# Patient Record
Sex: FEMALE | Race: WHITE | NOT HISPANIC OR LATINO | Employment: FULL TIME | URBAN - METROPOLITAN AREA
[De-identification: names, ages, dates, MRNs, and addresses within clinical notes are randomized per-mention and may not be internally consistent; named-entity substitution may affect disease eponyms.]

---

## 2017-01-09 ENCOUNTER — OFFICE VISIT (OUTPATIENT)
Dept: ONCOLOGY | Facility: CLINIC | Age: 57
End: 2017-01-09

## 2017-01-09 VITALS
RESPIRATION RATE: 15 BRPM | TEMPERATURE: 97.9 F | WEIGHT: 147 LBS | DIASTOLIC BLOOD PRESSURE: 66 MMHG | HEART RATE: 72 BPM | BODY MASS INDEX: 26.89 KG/M2 | SYSTOLIC BLOOD PRESSURE: 127 MMHG

## 2017-01-09 DIAGNOSIS — C50.411 MALIGNANT NEOPLASM OF UPPER-OUTER QUADRANT OF RIGHT FEMALE BREAST (HCC): Primary | ICD-10-CM

## 2017-01-09 PROCEDURE — 99213 OFFICE O/P EST LOW 20 MIN: CPT | Performed by: INTERNAL MEDICINE
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
## 2017-06-12 RX ORDER — ANASTROZOLE 1 MG/1
TABLET ORAL
Qty: 90 TABLET | Refills: 3 | Status: SHIPPED | OUTPATIENT
Start: 2017-06-12 | End: 2018-01-19 | Stop reason: SDUPTHER

## 2017-09-15 RX ORDER — OMEPRAZOLE 20 MG/1
CAPSULE, DELAYED RELEASE ORAL
Qty: 90 CAPSULE | Refills: 3 | Status: SHIPPED | OUTPATIENT
Start: 2017-09-15 | End: 2018-10-31 | Stop reason: SDUPTHER

## 2017-10-02 DIAGNOSIS — C50.411 MALIGNANT NEOPLASM OF UPPER-OUTER QUADRANT OF RIGHT FEMALE BREAST, UNSPECIFIED ESTROGEN RECEPTOR STATUS (HCC): Primary | ICD-10-CM

## 2018-01-19 ENCOUNTER — OFFICE VISIT (OUTPATIENT)
Dept: ONCOLOGY | Facility: CLINIC | Age: 58
End: 2018-01-19

## 2018-01-19 ENCOUNTER — HOSPITAL ENCOUNTER (OUTPATIENT)
Dept: GENERAL RADIOLOGY | Facility: HOSPITAL | Age: 58
Discharge: HOME | End: 2018-01-19
Admitting: NURSE PRACTITIONER

## 2018-01-19 ENCOUNTER — APPOINTMENT (OUTPATIENT)
Dept: LAB | Facility: HOSPITAL | Age: 58
End: 2018-01-19

## 2018-01-19 VITALS
HEIGHT: 62 IN | SYSTOLIC BLOOD PRESSURE: 137 MMHG | RESPIRATION RATE: 14 BRPM | BODY MASS INDEX: 26.68 KG/M2 | HEART RATE: 87 BPM | TEMPERATURE: 97.8 F | WEIGHT: 145 LBS | DIASTOLIC BLOOD PRESSURE: 64 MMHG

## 2018-01-19 DIAGNOSIS — M25.551 RIGHT HIP PAIN: Primary | ICD-10-CM

## 2018-01-19 DIAGNOSIS — C50.411 MALIGNANT NEOPLASM OF UPPER-OUTER QUADRANT OF RIGHT BREAST IN FEMALE, ESTROGEN RECEPTOR POSITIVE (HCC): ICD-10-CM

## 2018-01-19 DIAGNOSIS — Z17.0 MALIGNANT NEOPLASM OF UPPER-OUTER QUADRANT OF RIGHT BREAST IN FEMALE, ESTROGEN RECEPTOR POSITIVE (HCC): ICD-10-CM

## 2018-01-19 LAB
ALBUMIN SERPL-MCNC: 4.5 G/DL (ref 3.2–4.8)
ALBUMIN/GLOB SERPL: 1.5 G/DL (ref 1.5–2.5)
ALP SERPL-CCNC: 112 U/L (ref 25–100)
ALT SERPL W P-5'-P-CCNC: 23 U/L (ref 7–40)
ANION GAP SERPL CALCULATED.3IONS-SCNC: 8 MMOL/L (ref 3–11)
AST SERPL-CCNC: 27 U/L (ref 0–33)
BILIRUB SERPL-MCNC: 0.3 MG/DL (ref 0.3–1.2)
BUN BLD-MCNC: 14 MG/DL (ref 9–23)
BUN/CREAT SERPL: 23.3 (ref 7–25)
CALCIUM SPEC-SCNC: 10.2 MG/DL (ref 8.7–10.4)
CHLORIDE SERPL-SCNC: 103 MMOL/L (ref 99–109)
CO2 SERPL-SCNC: 30 MMOL/L (ref 20–31)
CREAT BLD-MCNC: 0.6 MG/DL (ref 0.6–1.3)
ERYTHROCYTE [DISTWIDTH] IN BLOOD BY AUTOMATED COUNT: 14 % (ref 11.3–14.5)
GFR SERPL CREATININE-BSD FRML MDRD: 103 ML/MIN/1.73
GLOBULIN UR ELPH-MCNC: 3.1 GM/DL
GLUCOSE BLD-MCNC: 87 MG/DL (ref 70–100)
HCT VFR BLD AUTO: 39.3 % (ref 34.5–44)
HGB BLD-MCNC: 12.6 G/DL (ref 11.5–15.5)
LYMPHOCYTES # BLD AUTO: 2 10*3/MM3 (ref 0.6–4.8)
LYMPHOCYTES NFR BLD AUTO: 33.8 % (ref 24–44)
MCH RBC QN AUTO: 28.2 PG (ref 27–31)
MCHC RBC AUTO-ENTMCNC: 32.2 G/DL (ref 32–36)
MCV RBC AUTO: 87.6 FL (ref 80–99)
MONOCYTES # BLD AUTO: 0.3 10*3/MM3 (ref 0–1)
MONOCYTES NFR BLD AUTO: 5.1 % (ref 0–12)
NEUTROPHILS # BLD AUTO: 3.7 10*3/MM3 (ref 1.5–8.3)
NEUTROPHILS NFR BLD AUTO: 61.1 % (ref 41–71)
PLATELET # BLD AUTO: 317 10*3/MM3 (ref 150–450)
PMV BLD AUTO: 8 FL (ref 6–12)
POTASSIUM BLD-SCNC: 4.2 MMOL/L (ref 3.5–5.5)
PROT SERPL-MCNC: 7.6 G/DL (ref 5.7–8.2)
RBC # BLD AUTO: 4.48 10*6/MM3 (ref 3.89–5.14)
SODIUM BLD-SCNC: 141 MMOL/L (ref 132–146)
WBC NRBC COR # BLD: 6 10*3/MM3 (ref 3.5–10.8)

## 2018-01-19 PROCEDURE — 99214 OFFICE O/P EST MOD 30 MIN: CPT | Performed by: NURSE PRACTITIONER

## 2018-01-19 PROCEDURE — 36415 COLL VENOUS BLD VENIPUNCTURE: CPT | Performed by: NURSE PRACTITIONER

## 2018-01-19 PROCEDURE — 80053 COMPREHEN METABOLIC PANEL: CPT | Performed by: NURSE PRACTITIONER

## 2018-01-19 PROCEDURE — 73502 X-RAY EXAM HIP UNI 2-3 VIEWS: CPT

## 2018-01-19 PROCEDURE — 85025 COMPLETE CBC W/AUTO DIFF WBC: CPT | Performed by: NURSE PRACTITIONER

## 2018-01-19 RX ORDER — ANASTROZOLE 1 MG/1
1 TABLET ORAL DAILY
Qty: 90 TABLET | Refills: 3 | Status: SHIPPED | OUTPATIENT
Start: 2018-01-19 | End: 2019-01-25 | Stop reason: SDUPTHER

## 2018-01-19 NOTE — PROGRESS NOTES
"      PROBLEM LIST:    Oncology/Hematology History     1. Stage IA ([id number]) ER positive, HI negative, HER-2 negative right upper  outer quadrant breast cancer, status post lumpectomy on 10/15/2014.   2. Oncotype DX score with intermediate risk of 26.   3. Completed 3 cycles of Taxotere Cytoxan, cycle #4 to be done on 01/19/2015.  4. Completed radiation therapy with Dr. Baxter on 04/02/2015.   5. Initiated hormone blockade with Arimidex until 04/2020.        Malignant neoplasm of upper-outer quadrant of right female breast     10/15/2014 Initial Diagnosis     Malignant neoplasm of upper-outer quadrant of right female breast         REASON FOR VISIT: Breast cancer management       Subjective     HISTORY OF PRESENT ILLNESS:   Mrs. Dumont is here for follow up evaluation of breast cancer management. She continues on Arimidex and tolerates it relatively well. She does have occasional hot flashes and joint discomfort. She complains of increased frequency of right hip pain that is intermittent. The pain bothers her at night at times. Pain is relieved with ibuprofen. She denies any other long bone pain, persistent cough, dyspnea, severe headaches, or diplopia.      Past Medical History, Past Surgical History, Social History, Family History have been reviewed and are without significant changes except as mentioned.    Review of Systems   A comprehensive 14 point review of systems was performed and was negative except as mentioned.    Medications:  The current medication list was reviewed in the EMR    ALLERGIES:    Allergies   Allergen Reactions   • Codeine      ABC Compound with Codeine #3       Objective      /64  Pulse 87  Temp 97.8 °F (36.6 °C)  Resp 14  Ht 157.5 cm (62\")  Wt 65.8 kg (145 lb)  BMI 26.52 kg/m2         General: well appearing, in no acute distress   HEENT: sclera anicteric, oropharynx clear  Lymphatics: no cervical, supraclavicular, or axillary adenopathy  Cardiovascular: regular rate and rhythm, " no murmurs  Lungs: clear to auscultation bilaterally  Abdomen: soft, nontender, nondistended.  No palpable masses or organomegaly  Extremeties: no lower extremity edema, cords or calf tenderness  Skin: no rashes, lesions, bruising, or petechiae    RECENT LABS:  Hematology WBC   Date Value Ref Range Status   01/19/2015 16.90 (H) 3.50 - 10.80 K/mcL Final     Hemoglobin   Date Value Ref Range Status   01/19/2015 11.7 11.5 - 15.5 g/dL Final     Hematocrit   Date Value Ref Range Status   01/19/2015 35.7 34.5 - 44.0 % Final     MCV   Date Value Ref Range Status   01/19/2015 88.3 80.0 - 99.0 fL Final     RDW   Date Value Ref Range Status   01/19/2015 14.8 (H) 11.3 - 14.5 % Final     MPV   Date Value Ref Range Status   01/19/2015 7.4 fL Final     Platelets   Date Value Ref Range Status   01/19/2015 388 150 - 450 K/mcL Final     Neutrophils Absolute   Date Value Ref Range Status   01/19/2015 15.00 (H) 1.50 - 8.30 K/mcL Final     Lymphocytes Absolute   Date Value Ref Range Status   01/19/2015 1.40 0.60 - 4.80 K/mcL Final     Monocytes Absolute   Date Value Ref Range Status   01/19/2015 0.50 0.00 - 1.00 K/mcL Final       Glucose   Date Value Ref Range Status   01/19/2015 88 70 - 100 mg/dL Final     Sodium   Date Value Ref Range Status   01/19/2015 141 132 - 146 mmol/L Final     Potassium   Date Value Ref Range Status   01/19/2015 4.4 3.5 - 5.5 mmol/L Final     CO2   Date Value Ref Range Status   01/19/2015 29 20 - 31 mmol/L Final     Chloride   Date Value Ref Range Status   01/19/2015 110 (H) 99 - 109 mmol/L Final     Anion Gap   Date Value Ref Range Status   01/19/2015 2 (L) 3 - 11 mmol/L Final     Creatinine   Date Value Ref Range Status   01/19/2015 0.5 (L) 0.6 - 1.3 mg/dL Final     BUN   Date Value Ref Range Status   01/19/2015 15 9 - 23 mg/dL Final     Calcium   Date Value Ref Range Status   01/19/2015 9.9 8.7 - 10.4 mg/dL Final     Alkaline Phosphatase   Date Value Ref Range Status   01/19/2015 87 25 - 100 Units/L Final      Total Protein   Date Value Ref Range Status   01/19/2015 7.3 5.7 - 8.2 g/dL Final     ALT (SGPT)   Date Value Ref Range Status   01/19/2015 28 7 - 40 Units/L Final     AST (SGOT)   Date Value Ref Range Status   01/19/2015 31 0 - 33 Units/L Final     Total Bilirubin   Date Value Ref Range Status   01/19/2015 0.3 0.3 - 1.2 mg/dL Final     Albumin   Date Value Ref Range Status   01/19/2015 4.2 3.2 - 4.8 g/dL Final       No results found for: LDH, URICACID       Mammogram 11/10/2017  Impression: No mammographic evidence for malignancy is seen on this exam. Recommend screening mammography per ACR guidelines in 1 year. BI-RADS category 2, being findings.       Assessment/Plan   Impression:   1. 56-year-old lady with stage IA ER positive breast cancer. She continues to do well on Arimidex with no clinical evidence of disease recurrence at this time.   2. Right hip pain. Sounds more likely to be arthritic but will check an xray of the right hip today to rule out metastasis.     Plan:   1. DEXA bone density soon.  2. Continue Arimidex and calcium with vitamin D.   3.  She will have a mammogram in 1 year.  4. Xray of right hip today.   5. CBC and CMP. We will notify her with any adverse findings.   6. We will see her back in 1 year for follow up evaluation. She has been instructed to contact us in the interm if any new symptoms arise.               Betty Booker APRMINNIE  [facility] Hematology and Oncology    1/19/2018          CC:

## 2018-11-02 RX ORDER — OMEPRAZOLE 20 MG/1
CAPSULE, DELAYED RELEASE ORAL
Qty: 90 CAPSULE | Refills: 3 | Status: SHIPPED | OUTPATIENT
Start: 2018-11-02 | End: 2019-11-11 | Stop reason: SDUPTHER

## 2018-11-15 ENCOUNTER — TELEPHONE (OUTPATIENT)
Dept: ONCOLOGY | Facility: CLINIC | Age: 58
End: 2018-11-15

## 2018-11-15 DIAGNOSIS — C50.411 MALIGNANT NEOPLASM OF UPPER-OUTER QUADRANT OF RIGHT BREAST IN FEMALE, ESTROGEN RECEPTOR POSITIVE (HCC): Primary | ICD-10-CM

## 2018-11-15 DIAGNOSIS — Z17.0 MALIGNANT NEOPLASM OF UPPER-OUTER QUADRANT OF RIGHT BREAST IN FEMALE, ESTROGEN RECEPTOR POSITIVE (HCC): Primary | ICD-10-CM

## 2018-11-15 NOTE — TELEPHONE ENCOUNTER
Returned call to patient and left message on her VM that her mammogram order was sent to [email or web address].

## 2018-11-15 NOTE — TELEPHONE ENCOUNTER
----- Message from Helena Laird sent at 11/15/2018  9:49 AM EST -----  Regarding: [facility] - NEW ORDER NEEDED   Contact: [phone]  PATIENT CALLED NEEDING A NEW ORDER FOR A DIAGNOSTIC MAMMOGRAM.     SHE HAS HER MAMMOGRAMS AT THE IMAGING [facility] IN [place], THEIR FAX [phone]    PLEASE FAX ORDER TO THEM

## 2019-01-25 ENCOUNTER — OFFICE VISIT (OUTPATIENT)
Dept: ONCOLOGY | Facility: CLINIC | Age: 59
End: 2019-01-25

## 2019-01-25 ENCOUNTER — LAB (OUTPATIENT)
Dept: LAB | Facility: HOSPITAL | Age: 59
End: 2019-01-25

## 2019-01-25 VITALS
DIASTOLIC BLOOD PRESSURE: 59 MMHG | WEIGHT: 153 LBS | BODY MASS INDEX: 28.16 KG/M2 | HEIGHT: 62 IN | RESPIRATION RATE: 16 BRPM | OXYGEN SATURATION: 96 % | SYSTOLIC BLOOD PRESSURE: 110 MMHG | TEMPERATURE: 97.7 F | HEART RATE: 77 BPM

## 2019-01-25 DIAGNOSIS — Z17.0 MALIGNANT NEOPLASM OF UPPER-OUTER QUADRANT OF RIGHT BREAST IN FEMALE, ESTROGEN RECEPTOR POSITIVE (HCC): ICD-10-CM

## 2019-01-25 DIAGNOSIS — C50.411 MALIGNANT NEOPLASM OF UPPER-OUTER QUADRANT OF RIGHT BREAST IN FEMALE, ESTROGEN RECEPTOR POSITIVE (HCC): Primary | ICD-10-CM

## 2019-01-25 DIAGNOSIS — Z17.0 MALIGNANT NEOPLASM OF UPPER-OUTER QUADRANT OF RIGHT BREAST IN FEMALE, ESTROGEN RECEPTOR POSITIVE (HCC): Primary | ICD-10-CM

## 2019-01-25 DIAGNOSIS — C50.411 MALIGNANT NEOPLASM OF UPPER-OUTER QUADRANT OF RIGHT BREAST IN FEMALE, ESTROGEN RECEPTOR POSITIVE (HCC): ICD-10-CM

## 2019-01-25 LAB
ALBUMIN SERPL-MCNC: 4.55 G/DL (ref 3.2–4.8)
ALBUMIN/GLOB SERPL: 1.8 G/DL (ref 1.5–2.5)
ALP SERPL-CCNC: 106 U/L (ref 25–100)
ALT SERPL W P-5'-P-CCNC: 24 U/L (ref 7–40)
ANION GAP SERPL CALCULATED.3IONS-SCNC: 5 MMOL/L (ref 3–11)
AST SERPL-CCNC: 26 U/L (ref 0–33)
BILIRUB SERPL-MCNC: 0.4 MG/DL (ref 0.3–1.2)
BUN BLD-MCNC: 17 MG/DL (ref 9–23)
BUN/CREAT SERPL: 23 (ref 7–25)
CALCIUM SPEC-SCNC: 9.3 MG/DL (ref 8.7–10.4)
CHLORIDE SERPL-SCNC: 106 MMOL/L (ref 99–109)
CO2 SERPL-SCNC: 30 MMOL/L (ref 20–31)
CREAT BLD-MCNC: 0.74 MG/DL (ref 0.6–1.3)
GFR SERPL CREATININE-BSD FRML MDRD: 80 ML/MIN/1.73
GLOBULIN UR ELPH-MCNC: 2.6 GM/DL
GLUCOSE BLD-MCNC: 88 MG/DL (ref 70–100)
POTASSIUM BLD-SCNC: 4.2 MMOL/L (ref 3.5–5.5)
PROT SERPL-MCNC: 7.1 G/DL (ref 5.7–8.2)
SODIUM BLD-SCNC: 141 MMOL/L (ref 132–146)

## 2019-01-25 PROCEDURE — 80053 COMPREHEN METABOLIC PANEL: CPT

## 2019-01-25 PROCEDURE — 36415 COLL VENOUS BLD VENIPUNCTURE: CPT

## 2019-01-25 PROCEDURE — 99213 OFFICE O/P EST LOW 20 MIN: CPT | Performed by: INTERNAL MEDICINE

## 2019-01-25 RX ORDER — ANASTROZOLE 1 MG/1
1 TABLET ORAL DAILY
Qty: 90 TABLET | Refills: 4 | Status: SHIPPED | OUTPATIENT
Start: 2019-01-25

## 2019-01-25 RX ORDER — SERTRALINE HYDROCHLORIDE 25 MG/1
25 TABLET, FILM COATED ORAL DAILY
Refills: 4 | COMMUNITY
Start: 2019-01-08

## 2019-01-25 NOTE — PROGRESS NOTES
"  PROBLEM LIST:    Oncology/Hematology History    1. Stage IA (K1pU5T1) ER positive, DC negative, HER-2 negative right upper  outer quadrant breast cancer, status post lumpectomy on 10/15/2014.   2. Oncotype DX score with intermediate risk of 26.   3. Completed 3 cycles of Taxotere Cytoxan, cycle #4 to be done on 01/19/2015.  4. Completed radiation therapy with Dr. Baxter on 04/02/2015.   5. Initiated hormone blockade with Arimidex until 04/2020.         Malignant neoplasm of upper-outer quadrant of right female breast (CMS/HCC)    10/15/2014 Initial Diagnosis     Malignant neoplasm of upper-outer quadrant of right female breast       10/15/2014 Cancer Staged     Cancer Staging  Malignant neoplasm of upper-outer quadrant of right female breast (CMS/HCC)  Staging form: Breast, AJCC V7  - Pathologic stage from 10/8/2014: Stage IA (T1c, N0, cM0) - Signed by Elba Aguilar MD on 1/25/2019              REASON FOR VISIT: Breast cancer management       Subjective     HISTORY OF PRESENT ILLNESS:   Mrs. Dumont is here for follow up evaluation of breast cancer management. She continues on Arimidex and tolerates it relatively well.  Hip pain has resolved    Past Medical History, Past Surgical History, Social History, Family History have been reviewed and are without significant changes except as mentioned.    Review of Systems   A comprehensive 14 point review of systems was performed and was negative except as mentioned.    Medications:  The current medication list was reviewed in the EMR    ALLERGIES:    Allergies   Allergen Reactions   • Codeine      ABC Compound with Codeine #3       Objective      /59   Pulse 77   Temp 97.7 °F (36.5 °C)   Resp 16   Ht 157.5 cm (62\")   Wt 69.4 kg (153 lb)   SpO2 96%   BMI 27.98 kg/m²          General: well appearing, in no acute distress   HEENT: sclera anicteric, oropharynx clear  Lymphatics: no cervical, supraclavicular, or axillary adenopathy  Cardiovascular: regular " rate and rhythm, no murmurs  Lungs: clear to auscultation bilaterally  Abdomen: soft, nontender, nondistended.  No palpable masses or organomegaly  Extremeties: no lower extremity edema, cords or calf tenderness  Skin: no rashes, lesions, bruising, or petechiae          Assessment/Plan   Impression:   1. 56-year-old lady with stage IA ER positive breast cancer. She continues to do well on Arimidex with no clinical evidence of disease recurrence at this time. Alk pos last year was slightly elevated - plan repeat lab today.      Plan:   1. Continue Arimidex and calcium with vitamin D.  I plan to stop her treatment next year.  She will complete in April 2020   2.  She will have a mammogram in 6 months      I spent a total of 15 minutes in direct patient care, greater than 10 minutes (greater than 50%) were spent in coordination of care, and counseling the patient regarding  her breast cancer . Answered any questions patient had with medication and plan.        Elba Aguilar MD  [facility] Hematology and Oncology    1/25/2019    Return on: 04/10/20  Return in (Approximately): 1 year    No orders of the defined types were placed in this encounter.          CC:

## 2019-11-11 RX ORDER — OMEPRAZOLE 20 MG/1
CAPSULE, DELAYED RELEASE ORAL
Qty: 90 CAPSULE | Refills: 3 | Status: SHIPPED | OUTPATIENT
Start: 2019-11-11 | End: 2020-11-16

## 2019-11-20 ENCOUNTER — TELEPHONE (OUTPATIENT)
Dept: ONCOLOGY | Facility: CLINIC | Age: 59
End: 2019-11-20

## 2019-11-20 NOTE — TELEPHONE ENCOUNTER
[facility] - PT CALLED AND ASKED IF [facility] CAN SEND A DIAGNOSTIC BI-LATERAL MAMMOGRAM TO THE IMAGING CENTER IN [place], [place] THE FAX # [phone] PLEASE CONTACT PT BACK @ [phone] IF THERE ARE ANY QUESTIONS

## 2019-11-21 DIAGNOSIS — Z17.0 MALIGNANT NEOPLASM OF UPPER-OUTER QUADRANT OF RIGHT BREAST IN FEMALE, ESTROGEN RECEPTOR POSITIVE (HCC): Primary | ICD-10-CM

## 2019-11-21 DIAGNOSIS — C50.411 MALIGNANT NEOPLASM OF UPPER-OUTER QUADRANT OF RIGHT BREAST IN FEMALE, ESTROGEN RECEPTOR POSITIVE (HCC): Primary | ICD-10-CM

## 2020-02-17 RX ORDER — ANASTROZOLE 1 MG/1
TABLET ORAL
OUTPATIENT
Start: 2020-02-17

## 2020-02-18 RX ORDER — ANASTROZOLE 1 MG/1
TABLET ORAL
OUTPATIENT
Start: 2020-02-18

## 2020-04-10 ENCOUNTER — OFFICE VISIT (OUTPATIENT)
Dept: ONCOLOGY | Facility: CLINIC | Age: 60
End: 2020-04-10

## 2020-04-10 VITALS
BODY MASS INDEX: 28.16 KG/M2 | HEART RATE: 77 BPM | OXYGEN SATURATION: 96 % | TEMPERATURE: 97.7 F | SYSTOLIC BLOOD PRESSURE: 136 MMHG | HEIGHT: 62 IN | WEIGHT: 153 LBS | DIASTOLIC BLOOD PRESSURE: 66 MMHG | RESPIRATION RATE: 16 BRPM

## 2020-04-10 DIAGNOSIS — Z17.0 MALIGNANT NEOPLASM OF UPPER-OUTER QUADRANT OF RIGHT BREAST IN FEMALE, ESTROGEN RECEPTOR POSITIVE (HCC): Primary | ICD-10-CM

## 2020-04-10 DIAGNOSIS — C50.411 MALIGNANT NEOPLASM OF UPPER-OUTER QUADRANT OF RIGHT BREAST IN FEMALE, ESTROGEN RECEPTOR POSITIVE (HCC): Primary | ICD-10-CM

## 2020-04-10 PROCEDURE — 99213 OFFICE O/P EST LOW 20 MIN: CPT | Performed by: INTERNAL MEDICINE

## 2020-04-10 NOTE — PROGRESS NOTES
PROBLEM LIST:    Oncology/Hematology History    1. Stage IA ([id number]) ER positive, CA negative, HER-2 negative right upper  outer quadrant breast cancer, status post lumpectomy on 10/15/2014.   2. Oncotype DX score with intermediate risk of 26.   3. Completed 3 cycles of Taxotere Cytoxan, cycle #4 to be done on 01/19/2015.  4. Completed radiation therapy with Dr. Baxter on 04/02/2015.   5. Initiated hormone blockade with Arimidex until 04/2020.         Malignant neoplasm of upper-outer quadrant of right female breast (CMS/HCC)    10/15/2014 Initial Diagnosis     Malignant neoplasm of upper-outer quadrant of right female breast      10/15/2014 Cancer Staged     Cancer Staging  Malignant neoplasm of upper-outer quadrant of right female breast (CMS/HCC)  Staging form: Breast, AJCC V7  - Pathologic stage from 10/8/2014: Stage IA (T1c, N0, cM0) - Signed by Elba Aguilar MD on 1/25/2019           REASON FOR VISIT: Breast cancer management       Subjective     HISTORY OF PRESENT ILLNESS:   Mrs. Dumont is here for follow up evaluation of breast cancer management. She continues on Arimidex and tolerates it relatively well.  She has now completed 5 years of adjuvant therapy with an aromatase inhibitor.    Past Medical History, Past Surgical History, Social History, Family History have been reviewed and are without significant changes except as mentioned.    Review of Systems   Constitutional: Negative.    HENT: Negative.    Eyes: Negative.    Respiratory: Negative.    Cardiovascular: Negative.    Gastrointestinal: Negative.    Endocrine: Negative.    Genitourinary: Negative.    Musculoskeletal: Negative.    Skin: Negative.    Allergic/Immunologic: Negative.    Neurological: Negative.    Hematological: Negative.    Psychiatric/Behavioral: Negative.       A comprehensive 14 point review of systems was performed and was negative except as mentioned.    Medications:  The current medication list was reviewed in the  EMR    ALLERGIES:    Allergies   Allergen Reactions   • Codeine      ABC Compound with Codeine #3       Objective      There were no vitals taken for this visit.         General: well appearing, in no acute distress   HEENT: sclera anicteric, oropharynx clear  Lymphatics: no cervical, supraclavicular, or axillary adenopathy  Cardiovascular: regular rate and rhythm, no murmurs  Lungs: clear to auscultation bilaterally  Abdomen: soft, nontender, nondistended.  No palpable masses or organomegaly  Extremeties: no lower extremity edema, cords or calf tenderness  Skin: no rashes, lesions, bruising, or petechiae        Assessment/Plan   Impression:   1. stage IA ER positive breast cancer.  She has now completed 5 years of adjuvant hormone blockade with Arimidex.  At this time she can discontinue treatment.  Her benefit of proceeding for 10 years is fairly small with significant issues with osteoporosis and fractures down the  line.  She will need yearly mammograms to continue.  She will call me if she becomes symptomatic.  She does not need any imaging other than yearly mammograms.      I spent a total of 15 minutes in direct patient care, greater than 10 minutes (greater than 50%) were spent in coordination of care, and counseling the patient regarding  her breast cancer . Answered any questions patient had with medication and plan.        Elba Aguilar MD  [facility] Hematology and Oncology    4/10/2020         No orders of the defined types were placed in this encounter.          CC:

## 2020-11-16 RX ORDER — OMEPRAZOLE 20 MG/1
CAPSULE, DELAYED RELEASE ORAL
Qty: 90 CAPSULE | Refills: 3 | Status: SHIPPED | OUTPATIENT
Start: 2020-11-16 | End: 2021-09-16

## 2021-04-19 ENCOUNTER — TELEPHONE (OUTPATIENT)
Dept: ONCOLOGY | Facility: CLINIC | Age: 61
End: 2021-04-19

## 2021-04-19 NOTE — TELEPHONE ENCOUNTER
Lucero from [employer] [facility] employee health benefits program: medication risk management outreach program, called regarding pt. They have identified a potential issue with the patient or a gap in care and they would like to speak with valerio to discuss this. Pt has been prescribed a proton pump inhibitor for longer 12 weeks and they are concerned about the potential long term adverse effects. They would like for Dr. Philip to discontinue this medication and only use it for active symptoms and prescribe something else for long term maintenance.    Please call her back at [phone]. She said that any of the nurses can speak with us on this, it does not have to be her specifically.     Ref # [id number]

## 2021-04-20 NOTE — TELEPHONE ENCOUNTER
Tried to call patient and was unable to reach her to discuss insurance. Will try to call her back later today.

## 2021-04-20 NOTE — TELEPHONE ENCOUNTER
Tried calling insurance company and reached out to patient for a second time. At this time nurse was unable to get a message to patient and insurance company had nurse on hold for some time.  If patient or insurance company may call nurse to discuss will not wait on hold since informed may take a while.

## 2021-09-16 RX ORDER — OMEPRAZOLE 20 MG/1
CAPSULE, DELAYED RELEASE ORAL
Qty: 90 CAPSULE | Refills: 1 | Status: SHIPPED | OUTPATIENT
Start: 2021-09-16